# Patient Record
Sex: FEMALE | Race: ASIAN | NOT HISPANIC OR LATINO | Employment: UNEMPLOYED | URBAN - METROPOLITAN AREA
[De-identification: names, ages, dates, MRNs, and addresses within clinical notes are randomized per-mention and may not be internally consistent; named-entity substitution may affect disease eponyms.]

---

## 2024-04-24 ENCOUNTER — APPOINTMENT (EMERGENCY)
Dept: RADIOLOGY | Facility: HOSPITAL | Age: 49
End: 2024-04-24
Payer: COMMERCIAL

## 2024-04-24 ENCOUNTER — HOSPITAL ENCOUNTER (EMERGENCY)
Facility: HOSPITAL | Age: 49
Discharge: HOME | End: 2024-04-24
Attending: EMERGENCY MEDICINE
Payer: COMMERCIAL

## 2024-04-24 VITALS
DIASTOLIC BLOOD PRESSURE: 64 MMHG | WEIGHT: 130.34 LBS | RESPIRATION RATE: 16 BRPM | OXYGEN SATURATION: 100 % | TEMPERATURE: 98 F | BODY MASS INDEX: 24.61 KG/M2 | HEIGHT: 61 IN | HEART RATE: 89 BPM | SYSTOLIC BLOOD PRESSURE: 128 MMHG

## 2024-04-24 DIAGNOSIS — M75.01 ADHESIVE CAPSULITIS OF RIGHT SHOULDER: Primary | ICD-10-CM

## 2024-04-24 PROCEDURE — 99283 EMERGENCY DEPT VISIT LOW MDM: CPT

## 2024-04-24 PROCEDURE — 73030 X-RAY EXAM OF SHOULDER: CPT

## 2024-04-24 PROCEDURE — 99284 EMERGENCY DEPT VISIT MOD MDM: CPT | Performed by: EMERGENCY MEDICINE

## 2024-04-24 RX ORDER — MELOXICAM 15 MG/1
15 TABLET ORAL DAILY
Qty: 30 TABLET | Refills: 0 | Status: CANCELLED | OUTPATIENT
Start: 2024-04-24 | End: 2024-05-24

## 2024-04-24 RX ORDER — METHYLPREDNISOLONE 16 MG/1
16 TABLET ORAL DAILY
Status: DISCONTINUED | OUTPATIENT
Start: 2024-04-26 | End: 2024-04-24

## 2024-04-24 RX ORDER — METHYLPREDNISOLONE 4 MG/1
12 TABLET ORAL DAILY
Status: DISCONTINUED | OUTPATIENT
Start: 2024-04-27 | End: 2024-04-24

## 2024-04-24 RX ORDER — OXYCODONE HYDROCHLORIDE AND ACETAMINOPHEN 5; 325 MG/1; MG/1
1 TABLET ORAL EVERY 4 HOURS PRN
Qty: 10 TABLET | Refills: 0 | Status: SHIPPED | OUTPATIENT
Start: 2024-04-24

## 2024-04-24 RX ORDER — METHYLPREDNISOLONE 4 MG/1
4 TABLET ORAL DAILY
Status: DISCONTINUED | OUTPATIENT
Start: 2024-04-29 | End: 2024-04-24

## 2024-04-24 RX ORDER — OXYCODONE HYDROCHLORIDE AND ACETAMINOPHEN 5; 325 MG/1; MG/1
1 TABLET ORAL ONCE
Status: COMPLETED | OUTPATIENT
Start: 2024-04-24 | End: 2024-04-24

## 2024-04-24 RX ORDER — MELOXICAM 15 MG/1
15 TABLET ORAL DAILY
Qty: 30 TABLET | Refills: 0 | Status: SHIPPED | OUTPATIENT
Start: 2024-04-24 | End: 2024-05-24

## 2024-04-24 RX ORDER — LIDOCAINE 50 MG/G
1 PATCH TOPICAL ONCE
Status: DISCONTINUED | OUTPATIENT
Start: 2024-04-24 | End: 2024-04-24 | Stop reason: HOSPADM

## 2024-04-24 RX ORDER — METHYLPREDNISOLONE 4 MG/1
TABLET ORAL
Qty: 21 TABLET | Refills: 0 | Status: SHIPPED | OUTPATIENT
Start: 2024-04-24

## 2024-04-24 RX ORDER — METHYLPREDNISOLONE 4 MG/1
8 TABLET ORAL DAILY
Status: DISCONTINUED | OUTPATIENT
Start: 2024-04-28 | End: 2024-04-24

## 2024-04-24 RX ADMIN — LIDOCAINE 1 PATCH: 50 PATCH CUTANEOUS at 04:22

## 2024-04-24 RX ADMIN — OXYCODONE HYDROCHLORIDE AND ACETAMINOPHEN 1 TABLET: 5; 325 TABLET ORAL at 04:21

## 2024-04-24 NOTE — ED ATTENDING ATTESTATION
4/24/2024  I, Dorcas Russell MD, saw and evaluated the patient. I have discussed the patient with the resident/non-physician practitioner and agree with the resident's/non-physician practitioner's findings, Plan of Care, and MDM as documented in the resident's/non-physician practitioner's note, except where noted. All available labs and Radiology studies were reviewed.  I was present for key portions of any procedure(s) performed by the resident/non-physician practitioner and I was immediately available to provide assistance.       At this point I agree with the current assessment done in the Emergency Department.  I have conducted an independent evaluation of this patient a history and physical is as follows:      47 yo female with h/o left frozen shoulder p/w right shoulder pain radiating down hand.  No trauma/inciting event.  Reports pain with ROM.  No numbness/tingling/weakness.  No systemic complaints.  Skin intact.  Feels otherwise well.  Has seen ortho already for this and had xrays and a cortisone shot as well.  Pt already goes to PT for this.  On exam pt uncomfortable appearing, non-toxic, painful and limited active/passive ROM right shoulder, no redness/warmth.  Skin is intact.  Compartments are soft.  No fullness or swelling of glenoid or upper arm. Benign elbow/wrist.  NV intact throughout.   Xray unremarkable.  Doubt septic joint/gout.  Likely inflammatory process/strain/sprain/adhesive capsulitis.  Will treat with anti-inflammatories/steroids and close ortho f/u.  RTER precautions discussed and documented on discharge paperwork, pt and family endorsed good understanding of reasons to return.           ED Course          Critical Care Time  Procedures

## 2024-04-24 NOTE — ED PROVIDER NOTES
History  Chief Complaint   Patient presents with    Arm Pain     Pt going to PT for right shoulder problems and now c/o pain in right hand and has limited range of motion. Denies injury to arm.      48-year-old female with no significant PMH who presents to the ED for right shoulder pain.  Patient states that she has right shoulder pain that is been ongoing since February but has been significantly worse yesterday.  Patient's been having a hard time sleeping tonight he states that the pain radiates into her right hand.  Patient describes the pain as a constant achy pain.  Yesterday throughout the day patient was able to do her daily activities.  Patient put ointment on the affected area along with taking Advil with minimal to no improvement in her symptoms.  Patient states that she saw her orthopedist who operated on her left shoulder for similar complaints and was told she likely has frozen shoulder of her right shoulder now.  Patient was given a steroid injection into her right shoulder as well as a course of steroid medication.  Patient has been going to PT for the last 2 weeks but is still having pain.  No other acute concerns or symptoms at this time. Denies chest pain, SOB, cough, abdominal pain, n/v/d, fever, chills, dizziness, lightheadedness, HA, dysuria, hematuria, hematochezia, or melena.               None       History reviewed. No pertinent past medical history.    History reviewed. No pertinent surgical history.    History reviewed. No pertinent family history.  I have reviewed and agree with the history as documented.    E-Cigarette/Vaping     E-Cigarette/Vaping Substances     Social History     Tobacco Use    Smoking status: Never    Smokeless tobacco: Never   Substance Use Topics    Alcohol use: Not Currently    Drug use: Not Currently        Review of Systems   Constitutional:  Negative for appetite change, chills, diaphoresis, fatigue and fever.   HENT:  Negative for congestion, ear pain,  postnasal drip, rhinorrhea, sore throat and trouble swallowing.    Eyes:  Negative for pain and visual disturbance.   Respiratory:  Negative for cough and shortness of breath.    Cardiovascular:  Negative for chest pain and palpitations.   Gastrointestinal:  Negative for abdominal pain, constipation, diarrhea, nausea and vomiting.   Genitourinary:  Negative for decreased urine volume, dysuria and hematuria.   Musculoskeletal:  Negative for arthralgias and back pain.        Right shoulder pain   Skin:  Negative for color change and rash.   Neurological:  Negative for dizziness, seizures, syncope, weakness, light-headedness and headaches.   All other systems reviewed and are negative.      Physical Exam  ED Triage Vitals [04/24/24 0314]   Temperature Pulse Respirations Blood Pressure SpO2   98 °F (36.7 °C) 89 16 128/64 100 %      Temp src Heart Rate Source Patient Position - Orthostatic VS BP Location FiO2 (%)   -- -- -- -- --      Pain Score       --             Orthostatic Vital Signs  Vitals:    04/24/24 0314   BP: 128/64   Pulse: 89       Physical Exam  Vitals and nursing note reviewed.   Constitutional:       Appearance: Normal appearance. She is normal weight.   HENT:      Head: Normocephalic and atraumatic.      Right Ear: External ear normal.      Left Ear: External ear normal.      Nose: Nose normal.      Mouth/Throat:      Pharynx: Oropharynx is clear.   Eyes:      Conjunctiva/sclera: Conjunctivae normal.   Cardiovascular:      Rate and Rhythm: Normal rate and regular rhythm.      Pulses: Normal pulses.      Heart sounds: Normal heart sounds.      Comments: RRR with +S1 and S2, no murmurs appreciated on exam. Peripheral pulses intact.    Pulmonary:      Effort: Pulmonary effort is normal. No respiratory distress.      Breath sounds: Normal breath sounds. No wheezing, rhonchi or rales.      Comments: CTABL with no abnormal lung sounds such as wheezes or rales appreciated on exam.     Abdominal:      General:  Abdomen is flat. Bowel sounds are normal. There is no distension.      Palpations: Abdomen is soft.      Tenderness: There is no abdominal tenderness.      Comments: Soft, non tender, normo-active bowel sounds. Without rigidity, guarding, or distension.     Musculoskeletal:         General: Tenderness present. Normal range of motion.      Cervical back: Normal range of motion.      Comments: Significant tenderness to palpation of the right shoulder along the anterior and posterior aspect.  Decreased ROM secondary to pain with active and passive movements of the right shoulder and unable to abduct past 90 degrees. 5/5 strength in BL upper and lower extremities. No signs of abrasions, ecchymosis, erythema, or gross deformity was noted.     Skin:     General: Skin is warm and dry.   Neurological:      General: No focal deficit present.      Mental Status: She is alert and oriented to person, place, and time. Mental status is at baseline.      Comments: CN grossly intact on visualization. No focal neurologic deficits noted on exam.  5/5 strength in all extremities. Neurovascularly intact with normal sensation and motor function.             ED Medications  Medications   lidocaine (LIDODERM) 5 % patch 1 patch (1 patch Topical Medication Applied 4/24/24 0422)   oxyCODONE-acetaminophen (PERCOCET) 5-325 mg per tablet 1 tablet (1 tablet Oral Given 4/24/24 0421)       Diagnostic Studies  Results Reviewed       None                   XR shoulder 2+ views RIGHT   ED Interpretation by Marcos Kent MD (04/24 0642)   Image was independently interpreted by myself and showed no acute concerns or fractures at this time.              Procedures  Procedures      ED Course                             SBIRT 20yo+      Flowsheet Row Most Recent Value   Initial Alcohol Screen: US AUDIT-C     1. How often do you have a drink containing alcohol? 0 Filed at: 04/24/2024 4677   2. How many drinks containing alcohol do you have on a typical day  you are drinking?  0 Filed at: 04/24/2024 0316   3a. Male UNDER 65: How often do you have five or more drinks on one occasion? 0 Filed at: 04/24/2024 0316   3b. FEMALE Any Age, or MALE 65+: How often do you have 4 or more drinks on one occassion? 0 Filed at: 04/24/2024 0316   Audit-C Score 0 Filed at: 04/24/2024 0316   SUAD: How many times in the past year have you...    Used an illegal drug or used a prescription medication for non-medical reasons? Never Filed at: 04/24/2024 0316                  Medical Decision Making  48-year-old female with no significant PMH presented to the ED for right shoulder pain.  Patient's images were obtained and reviewed by the ED provider.  Patient was reassured of her symptoms and explained she is likely experiencing adhesive capsulitis similar to her left shoulder.  Patient's x-ray images of her right shoulder show no acute fractures or concerns at this time.  While she was in the emergency department, patient was given 1 tablet of Percocet along with a lidocaine patch for pain relief.  Through shared decision making to the patient and the provider, the patient was planned for discharge.  Patient was advised to follow-up outpatient with her PCP as well as her orthopedist.  Patient was also prescribed meloxicam, methylprednisolone, and Percocet.  Patient was instructed to return to the ED if her symptoms worsen including but not limited to inability to move her right arm, increasing pain severity, fever, chills, color change of her right arm, increased numbness or tingling, or changes in her behavior.    Amount and/or Complexity of Data Reviewed  Radiology: ordered.    Risk  Prescription drug management.          Disposition  Final diagnoses:   Adhesive capsulitis of right shoulder     Time reflects when diagnosis was documented in both MDM as applicable and the Disposition within this note       Time User Action Codes Description Comment    4/24/2024  5:13 AM Marcos Kent  [M75.01] Adhesive capsulitis of right shoulder           ED Disposition       ED Disposition   Discharge    Condition   Stable    Date/Time   Wed Apr 24, 2024 0521    Comment   Julia Hurd discharge to home/self care.                   Follow-up Information       Follow up With Specialties Details Why Contact Info Additional Information    [facility] Emergency Medicine Go to  If symptoms worsen [address] [facility] [id number]  [phone] [facility], [address], [place], [address]            Discharge Medication List as of 4/24/2024  5:29 AM        START taking these medications    Details   meloxicam (Mobic) 15 mg tablet Take 1 tablet (15 mg total) by mouth daily for 30 doses, Starting Wed 4/24/2024, Until Fri 5/24/2024, Normal      methylPREDNISolone 4 MG tablet therapy pack Use as directed on package, Normal      oxyCODONE-acetaminophen (Percocet) 5-325 mg per tablet Take 1 tablet by mouth every 4 (four) hours as needed for moderate pain for up to 10 doses Max Daily Amount: 6 tablets, Starting Wed 4/24/2024, Normal           No discharge procedures on file.    PDMP Review       None             ED Provider  Attending physically available and evaluated Julia Hurd. I managed the patient along with the ED Attending.    Electronically Signed by           Marcos Kent MD  04/24/24 9017